# Patient Record
Sex: MALE | Race: WHITE | NOT HISPANIC OR LATINO | Employment: STUDENT | URBAN - METROPOLITAN AREA
[De-identification: names, ages, dates, MRNs, and addresses within clinical notes are randomized per-mention and may not be internally consistent; named-entity substitution may affect disease eponyms.]

---

## 2024-03-12 ENCOUNTER — LAB (OUTPATIENT)
Dept: LAB | Facility: HOSPITAL | Age: 19
End: 2024-03-12
Payer: COMMERCIAL

## 2024-03-12 ENCOUNTER — OFFICE VISIT (OUTPATIENT)
Dept: FAMILY MEDICINE CLINIC | Age: 19
End: 2024-03-12
Payer: COMMERCIAL

## 2024-03-12 VITALS
SYSTOLIC BLOOD PRESSURE: 112 MMHG | DIASTOLIC BLOOD PRESSURE: 63 MMHG | HEIGHT: 72 IN | HEART RATE: 70 BPM | BODY MASS INDEX: 25.71 KG/M2 | WEIGHT: 189.8 LBS

## 2024-03-12 DIAGNOSIS — Z13.29 SCREENING FOR THYROID DISORDER: ICD-10-CM

## 2024-03-12 DIAGNOSIS — Z00.00 ANNUAL PHYSICAL EXAM: ICD-10-CM

## 2024-03-12 DIAGNOSIS — R19.7 DIARRHEA, UNSPECIFIED TYPE: ICD-10-CM

## 2024-03-12 DIAGNOSIS — Z00.00 ENCOUNTER FOR MEDICAL EXAMINATION TO ESTABLISH CARE: Primary | ICD-10-CM

## 2024-03-12 LAB
027 TOXIN: NORMAL
ALBUMIN SERPL-MCNC: 4.6 G/DL (ref 3.5–5.2)
ALBUMIN/GLOB SERPL: 2.2 G/DL
ALP SERPL-CCNC: 90 U/L (ref 39–117)
ALT SERPL W P-5'-P-CCNC: 13 U/L (ref 1–41)
ANION GAP SERPL CALCULATED.3IONS-SCNC: 8.7 MMOL/L (ref 5–15)
AST SERPL-CCNC: 11 U/L (ref 1–40)
BASOPHILS # BLD AUTO: 0.05 10*3/MM3 (ref 0–0.2)
BASOPHILS NFR BLD AUTO: 0.9 % (ref 0–1.5)
BILIRUB SERPL-MCNC: 0.4 MG/DL (ref 0–1.2)
BUN SERPL-MCNC: 10 MG/DL (ref 6–20)
BUN/CREAT SERPL: 11.1 (ref 7–25)
C DIFF TOX GENS STL QL NAA+PROBE: NEGATIVE
CALCIUM SPEC-SCNC: 9.2 MG/DL (ref 8.6–10.5)
CHLORIDE SERPL-SCNC: 105 MMOL/L (ref 98–107)
CO2 SERPL-SCNC: 25.3 MMOL/L (ref 22–29)
CREAT SERPL-MCNC: 0.9 MG/DL (ref 0.76–1.27)
DEPRECATED RDW RBC AUTO: 40.6 FL (ref 37–54)
EGFRCR SERPLBLD CKD-EPI 2021: 125.4 ML/MIN/1.73
EOSINOPHIL # BLD AUTO: 0.14 10*3/MM3 (ref 0–0.4)
EOSINOPHIL NFR BLD AUTO: 2.6 % (ref 0.3–6.2)
ERYTHROCYTE [DISTWIDTH] IN BLOOD BY AUTOMATED COUNT: 12.5 % (ref 12.3–15.4)
GLOBULIN UR ELPH-MCNC: 2.1 GM/DL
GLUCOSE SERPL-MCNC: 82 MG/DL (ref 65–99)
H. PYLORI ANTIGEN STOOL: NEGATIVE
HCT VFR BLD AUTO: 46.3 % (ref 37.5–51)
HEMOCCULT STL QL IA: NEGATIVE
HGB BLD-MCNC: 15.7 G/DL (ref 13–17.7)
IMM GRANULOCYTES # BLD AUTO: 0 10*3/MM3 (ref 0–0.05)
IMM GRANULOCYTES NFR BLD AUTO: 0 % (ref 0–0.5)
LACTOFERRIN STL QL LA: NEGATIVE
LYMPHOCYTES # BLD AUTO: 1.99 10*3/MM3 (ref 0.7–3.1)
LYMPHOCYTES NFR BLD AUTO: 37.6 % (ref 19.6–45.3)
MCH RBC QN AUTO: 29.6 PG (ref 26.6–33)
MCHC RBC AUTO-ENTMCNC: 33.9 G/DL (ref 31.5–35.7)
MCV RBC AUTO: 87.4 FL (ref 79–97)
MONOCYTES # BLD AUTO: 0.55 10*3/MM3 (ref 0.1–0.9)
MONOCYTES NFR BLD AUTO: 10.4 % (ref 5–12)
NEUTROPHILS NFR BLD AUTO: 2.56 10*3/MM3 (ref 1.7–7)
NEUTROPHILS NFR BLD AUTO: 48.5 % (ref 42.7–76)
PLATELET # BLD AUTO: 254 10*3/MM3 (ref 140–450)
PMV BLD AUTO: 9.3 FL (ref 6–12)
POTASSIUM SERPL-SCNC: 4.1 MMOL/L (ref 3.5–5.2)
PROT SERPL-MCNC: 6.7 G/DL (ref 6–8.5)
RBC # BLD AUTO: 5.3 10*6/MM3 (ref 4.14–5.8)
SODIUM SERPL-SCNC: 139 MMOL/L (ref 136–145)
TSH SERPL DL<=0.05 MIU/L-ACNC: 0.76 UIU/ML (ref 0.27–4.2)
WBC NRBC COR # BLD AUTO: 5.29 10*3/MM3 (ref 3.4–10.8)

## 2024-03-12 PROCEDURE — 87338 HPYLORI STOOL AG IA: CPT

## 2024-03-12 PROCEDURE — 36415 COLL VENOUS BLD VENIPUNCTURE: CPT

## 2024-03-12 PROCEDURE — 82274 ASSAY TEST FOR BLOOD FECAL: CPT

## 2024-03-12 PROCEDURE — 80050 GENERAL HEALTH PANEL: CPT

## 2024-03-12 PROCEDURE — 82653 EL-1 FECAL QUANTITATIVE: CPT

## 2024-03-12 PROCEDURE — 87493 C DIFF AMPLIFIED PROBE: CPT

## 2024-03-12 PROCEDURE — 83630 LACTOFERRIN FECAL (QUAL): CPT

## 2024-03-12 PROCEDURE — 87506 IADNA-DNA/RNA PROBE TQ 6-11: CPT

## 2024-03-12 RX ORDER — DICYCLOMINE HCL 20 MG
20 TABLET ORAL
Qty: 180 TABLET | Refills: 1 | Status: SHIPPED | OUTPATIENT
Start: 2024-03-12

## 2024-03-12 NOTE — PROGRESS NOTES
"Margie Chau presents to [facility] with complaint of  GI Problem (Diarrhea/stomach pain after eating, pt states it has been going on for quite sometime. )    SUBJECTIVE  History of Present Illness    Very pleasant patient is here to establish care. He is present with his mom. He is a student at [employer] and is majoring in economics and finance.  No significant past medical history, he has surgical history of tonsillectomy and wisdom teeth removal.  No family history.    He is being seen today to discuss ongoing abdominal pain and diarrhea.  Patient says that has been going on for the past 6 months but has gradually worsened.  Pain is described as a cramping sensation that occurs immediately after he has eaten.  Pain does not radiate.  Patient says that he has up to 6 loose stools per day.  Denies any blood in his stools.  He does endorse having some nausea with only one episode of vomiting. He has been using bentyl 20 mg tid which has helped somewhat as now he is only having 3 loose stools daily.    The following portions of the patient's history were reviewed and updated as appropriate: allergies, current medications, past family history, past medical history, past social history, past surgical history and problem list.    OBJECTIVE  Vital Signs:   /63 (BP Location: Right arm, Patient Position: Sitting)   Pulse 70   Ht 182.9 cm (72\")   Wt 86.1 kg (189 lb 12.8 oz)   BMI 25.74 kg/m²       Physical Exam  Constitutional:       General: He is not in acute distress.     Appearance: Normal appearance. He is not ill-appearing.   HENT:      Head: Normocephalic and atraumatic.      Right Ear: Tympanic membrane and ear canal normal.      Left Ear: Tympanic membrane and ear canal normal.      Nose: Nose normal.      Mouth/Throat:      Pharynx: Oropharynx is clear.   Neck:      Thyroid: No thyroid mass, thyromegaly or thyroid tenderness.   Cardiovascular:      Rate and Rhythm: " Normal rate and regular rhythm.      Pulses: Normal pulses.      Heart sounds: Normal heart sounds.   Pulmonary:      Effort: Pulmonary effort is normal. No respiratory distress.      Breath sounds: Normal breath sounds.   Chest:      Chest wall: No tenderness.   Abdominal:      General: Bowel sounds are normal. There is no distension.      Palpations: Abdomen is soft. There is no mass.      Tenderness: There is abdominal tenderness in the right lower quadrant. There is no guarding or rebound. Negative signs include Perkins's sign.   Musculoskeletal:         General: Normal range of motion.      Cervical back: Normal range of motion and neck supple.   Lymphadenopathy:      Cervical: No cervical adenopathy.   Skin:     General: Skin is warm and dry.   Neurological:      General: No focal deficit present.      Mental Status: He is alert and oriented to person, place, and time. Mental status is at baseline.   Psychiatric:         Mood and Affect: Mood normal.         Behavior: Behavior normal.            ASSESSMENT AND PLAN:  Diagnoses and all orders for this visit:    1. Encounter for medical examination to establish care (Primary)    2. Screening for thyroid disorder  -     TSH Rfx On Abnormal To Free T4; Future    3. Diarrhea, unspecified type  -     CBC & Differential; Future  -     Comprehensive Metabolic Panel; Future  -     Clostridioides difficile Toxin, PCR - Stool, Per Rectum; Future  -     Enteric Bacterial Panel - Stool, Per Rectum; Future  -     Enteric Parasite Panel - Stool, Per Rectum; Future  -     Fecal Lactoferrin Qual. - Stool, Per Rectum; Future  -     Pancreatic Elastase, Fecal - Stool, Per Rectum; Future  -     Occult Blood, Fecal By Immunoassay - Stool, Per Rectum; Future  -     H. Pylori Antigen, Stool - Stool, Per Rectum; Future  -     dicyclomine (BENTYL) 20 MG tablet; Take 1 tablet by mouth Every 4 (Four) to 6 (Six) Hours As Needed for Abdominal Cramping.  Dispense: 180 tablet; Refill:  1      Symptoms consistent with IBS, but checking Cmp Cbc and Tsh as well as stools studies. He was given bentyl refills in the meantime. May need imaging in the future. Discussed strict Er precautions.       Follow Up   Return for depending on lab results. Patient to notify office with any acute concerns or issues.  Patient verbalizes understanding, agrees with plan of care and has no further questions upon discharge.     Patient was given instructions and counseling regarding his condition or for health maintenance advice. Please see specific information pulled into the AVS if appropriate.     Discussed the importance of following up with any needed screening tests/labs/specialist appointments and any requested follow-up recommended by me today. Importance of maintaining follow-up discussed and patient accepts that missed appointments can delay diagnosis and potentially lead to worsening of conditions.    Part of this note may be an electronic transcription/translation of spoken language to printed text using the Dragon Dictation System.

## 2024-03-13 LAB
C COLI+JEJ+UPSA DNA STL QL NAA+NON-PROBE: NOT DETECTED
CRYPTOSP DNA STL QL NAA+NON-PROBE: NOT DETECTED
E HISTOLYT DNA STL QL NAA+NON-PROBE: NOT DETECTED
EC STX1+STX2 GENES STL QL NAA+NON-PROBE: NOT DETECTED
G LAMBLIA DNA STL QL NAA+NON-PROBE: NOT DETECTED
S ENT+BONG DNA STL QL NAA+NON-PROBE: NOT DETECTED
SHIGELLA SP+EIEC IPAH ST NAA+NON-PROBE: NOT DETECTED

## 2024-03-14 ENCOUNTER — OFFICE VISIT (OUTPATIENT)
Dept: FAMILY MEDICINE CLINIC | Age: 19
End: 2024-03-14
Payer: COMMERCIAL

## 2024-03-14 ENCOUNTER — HOSPITAL ENCOUNTER (OUTPATIENT)
Dept: GENERAL RADIOLOGY | Facility: HOSPITAL | Age: 19
Discharge: HOME | End: 2024-03-14
Admitting: NURSE PRACTITIONER
Payer: COMMERCIAL

## 2024-03-14 VITALS
TEMPERATURE: 97.9 F | OXYGEN SATURATION: 98 % | HEIGHT: 72 IN | WEIGHT: 186 LBS | BODY MASS INDEX: 25.19 KG/M2 | SYSTOLIC BLOOD PRESSURE: 112 MMHG | DIASTOLIC BLOOD PRESSURE: 63 MMHG | HEART RATE: 86 BPM

## 2024-03-14 DIAGNOSIS — R10.30 LOWER ABDOMINAL PAIN: ICD-10-CM

## 2024-03-14 DIAGNOSIS — R10.30 LOWER ABDOMINAL PAIN: Primary | ICD-10-CM

## 2024-03-14 DIAGNOSIS — F43.9 SITUATIONAL STRESS: ICD-10-CM

## 2024-03-14 PROCEDURE — 74018 RADEX ABDOMEN 1 VIEW: CPT

## 2024-03-14 PROCEDURE — 99214 OFFICE O/P EST MOD 30 MIN: CPT | Performed by: NURSE PRACTITIONER

## 2024-03-14 NOTE — PROGRESS NOTES
Chief Complaint  Margie Chau presents to [facility] for Abdominal Pain (Worsening over the last month)    Subjective          History of Present Illness    Margie is here today with mother. C/o bilateral lower abdominal and pelvic pain. Reports started about one month ago. Doesn't matter what he is eating. Some relief with bentyl. Reports alternating diarrhea and constipation. Denies heartburn. He had lab work earlier this week that has resulted as normal so far. He had an intense episode of pain yesterday which has prompted his visit today.   Does report some stressors with school and extracurricular activities recently.     Review of Systems      No Known Allergies   History reviewed. No pertinent past medical history.  Current Outpatient Medications   Medication Sig Dispense Refill    dicyclomine (BENTYL) 20 MG tablet Take 1 tablet by mouth Every 4 (Four) to 6 (Six) Hours As Needed for Abdominal Cramping. 180 tablet 1     No current facility-administered medications for this visit.     Past Surgical History:   Procedure Laterality Date    TONSILLECTOMY      WISDOM TOOTH EXTRACTION        Social History     Tobacco Use    Smoking status: Never     Passive exposure: Never    Smokeless tobacco: Never   Vaping Use    Vaping status: Never Used   Substance Use Topics    Alcohol use: Never    Drug use: Never     History reviewed. No pertinent family history.  Health Maintenance Due   Topic Date Due    HEPATITIS C SCREENING  Never done    ANNUAL PHYSICAL  Never done      Immunization History   Administered Date(s) Administered    COVID-19 (PFIZER) Purple Cap Monovalent 04/16/2021, 05/07/2021, 01/11/2022    COVID-19 F23 (PFIZER) 12YRS+ (COMIRNATY) 12/07/2023    DTaP 5 2004, 2004, 2004, 07/18/2005, 03/26/2008    FluMist 2-49yrs 11/02/2007, 10/06/2015    Fluzone (or Fluarix & Flulaval for VFC) >6mos 09/27/2019, 11/11/2021, 10/06/2023    HPV Quadrivalent 07/06/2015     "Hep A, 2 Dose 01/24/2018, 08/07/2018    Hep B, Adolescent or Pediatric 2004, 2004, 07/18/2005    Hib (PRP-T) 2004, 2004, 07/18/2005    Hpv9 01/24/2018    IPV 2004, 2004, 2004, 03/26/2008    Influenza Injectable Mdck Pf Quad 10/24/2022    Influenza LAIV (Nasal) 12/18/2006, 09/24/2008, 09/10/2009, 09/24/2010, 08/16/2011, 10/05/2012, 10/22/2013, 10/14/2014    Influenza Seasonal Injectable 10/20/2005, 10/20/2016    MCV4 Unspecified 03/06/2020    MMR 03/04/2005, 03/26/2008    Meningococcal Conjugate 07/06/2015    PEDS-Pneumococcal Conjugate (PCV7) 2004, 2004, 2004, 07/18/2005    Tdap 07/06/2015    Trumenba(meningococcal B) 03/06/2020, 08/31/2022    Varicella 03/04/2005, 03/26/2008        Objective     Vitals:    03/14/24 1318   BP: 112/63   BP Location: Right arm   Patient Position: Sitting   Cuff Size: Large Adult   Pulse: 86   Temp: 97.9 °F (36.6 °C)   TempSrc: Oral   SpO2: 98%   Weight: 84.4 kg (186 lb)   Height: 182.9 cm (72\")     Body mass index is 25.23 kg/m².             Physical Exam  Vitals reviewed.   Constitutional:       General: He is not in acute distress.     Appearance: Normal appearance. He is well-developed.   HENT:      Head: Normocephalic and atraumatic.   Cardiovascular:      Rate and Rhythm: Normal rate and regular rhythm.   Pulmonary:      Effort: Pulmonary effort is normal.      Breath sounds: Normal breath sounds.   Abdominal:      General: Bowel sounds are normal.      Palpations: Abdomen is soft.      Tenderness: There is abdominal tenderness (bilateral lower abdomen, minimal).   Neurological:      Mental Status: He is alert and oriented to person, place, and time.   Psychiatric:         Mood and Affect: Mood and affect normal.           Result Review :               H. Pylori Antigen, Stool - Stool, Per Rectum (03/12/2024 15:52)  Occult Blood, Fecal By Immunoassay - Stool, Per Rectum (03/12/2024 15:52)  Fecal Lactoferrin Qual. - " Stool, Per Rectum (03/12/2024 15:52)  Enteric Parasite Panel - Stool, Per Rectum (03/12/2024 15:52)  Enteric Bacterial Panel - Stool, Per Rectum (03/12/2024 15:52)  Clostridioides difficile Toxin, PCR - Stool, Per Rectum (03/12/2024 15:52)  TSH Rfx On Abnormal To Free T4 (03/12/2024 14:49)  Comprehensive Metabolic Panel (03/12/2024 14:49)  CBC & Differential (03/12/2024 14:49)     XR Abdomen KUB (03/14/2024 13:58)            Assessment and Plan      Diagnoses and all orders for this visit:    1. Lower abdominal pain (Primary)  Comments:  Xray normal. With labs normal, will proceed with CT scan for further evaluation. May continue bentyl. ER precautions given.  Orders:  -     XR Abdomen KUB; Future  -     CT Abdomen Pelvis With Contrast; Future    2. Situational stress  Comments:  Discussed stress and anxiety and possible relation to abdominal symptoms. Discussed medication and therapy. He will think about options.              Follow Up     Return for As needed for persistent or worsening symptoms.

## 2024-03-18 LAB — ELASTASE PANC STL-MCNT: 320 UG ELAST./G

## 2024-03-22 ENCOUNTER — HOSPITAL ENCOUNTER (OUTPATIENT)
Dept: CT IMAGING | Facility: HOSPITAL | Age: 19
Discharge: HOME | End: 2024-03-22
Admitting: NURSE PRACTITIONER
Payer: COMMERCIAL

## 2024-03-22 ENCOUNTER — OFFICE VISIT (OUTPATIENT)
Dept: FAMILY MEDICINE CLINIC | Age: 19
End: 2024-03-22
Payer: COMMERCIAL

## 2024-03-22 VITALS
WEIGHT: 184.8 LBS | HEIGHT: 72 IN | DIASTOLIC BLOOD PRESSURE: 67 MMHG | BODY MASS INDEX: 25.03 KG/M2 | HEART RATE: 67 BPM | SYSTOLIC BLOOD PRESSURE: 125 MMHG

## 2024-03-22 DIAGNOSIS — R10.30 LOWER ABDOMINAL PAIN: ICD-10-CM

## 2024-03-22 DIAGNOSIS — K76.9 LIVER LESION: ICD-10-CM

## 2024-03-22 DIAGNOSIS — F41.1 GAD (GENERALIZED ANXIETY DISORDER): ICD-10-CM

## 2024-03-22 DIAGNOSIS — R19.7 DIARRHEA, UNSPECIFIED TYPE: ICD-10-CM

## 2024-03-22 DIAGNOSIS — R10.30 LOWER ABDOMINAL PAIN: Primary | ICD-10-CM

## 2024-03-22 PROCEDURE — 99214 OFFICE O/P EST MOD 30 MIN: CPT | Performed by: NURSE PRACTITIONER

## 2024-03-22 PROCEDURE — 74177 CT ABD & PELVIS W/CONTRAST: CPT

## 2024-03-22 PROCEDURE — 25510000001 IOPAMIDOL PER 1 ML: Performed by: NURSE PRACTITIONER

## 2024-03-22 RX ORDER — BUSPIRONE HYDROCHLORIDE 5 MG/1
5 TABLET ORAL 3 TIMES DAILY PRN
Qty: 90 TABLET | Refills: 0 | Status: SHIPPED | OUTPATIENT
Start: 2024-03-22

## 2024-03-22 RX ORDER — ESCITALOPRAM OXALATE 10 MG/1
10 TABLET ORAL DAILY
Qty: 90 TABLET | Refills: 0 | Status: SHIPPED | OUTPATIENT
Start: 2024-03-22

## 2024-03-22 RX ADMIN — IOPAMIDOL 100 ML: 755 INJECTION, SOLUTION INTRAVENOUS at 13:44

## 2024-03-22 NOTE — PROGRESS NOTES
"Margie Chau presents to [facility] with complaint of  Anxiety, abdominal pain follow-up    SUBJECTIVE  History of Present Illness    Patient is here with his mother to discuss ongoing abdominal pain and anxiety symptoms.  He saw Olga Rodriguez on March 14, had KUB completed that was negative.  He had abdominal CT completed today that showed incidental indeterminate focus of the right hepatic lobe measuring 3.6 x 1.3 cm.  Radiologist recommends abdominal MRI with and without contrast to further evaluate this area.  Patient is not having any pain over the area of his liver.  His pain is suprapubic and in groin area.  Since he was seen by me, patient has had multiple episodes of diarrhea.  Patient says he feels like he is going to have the urge to have diarrhea and sometimes he will and sometimes he will not.  Patient says that he feels he is unable to complete daily tasks or go in town as he is afraid that he will not be close enough to the bathroom.  He says he does not feel that he is overly anxious and is not sure if his bowel habits and abdominal pain is related to his anxiety, however he is willing to see if any anxiety medication may help improve his diarrhea/abdominal cramping.      OBJECTIVE  Vital Signs:   /67 (BP Location: Right arm, Patient Position: Sitting)   Pulse 67   Ht 182.9 cm (72\")   Wt 83.8 kg (184 lb 12.8 oz)   BMI 25.06 kg/m²       Physical Exam  Constitutional:       General: He is not in acute distress.     Appearance: Normal appearance. He is not ill-appearing.   HENT:      Head: Normocephalic and atraumatic.      Nose: Nose normal.      Mouth/Throat:      Pharynx: Oropharynx is clear.   Cardiovascular:      Rate and Rhythm: Normal rate and regular rhythm.      Pulses: Normal pulses.      Heart sounds: Normal heart sounds.   Pulmonary:      Effort: Pulmonary effort is normal. No respiratory distress.      Breath sounds: Normal breath sounds. "   Chest:      Chest wall: No tenderness.   Musculoskeletal:         General: Normal range of motion.      Cervical back: Normal range of motion and neck supple.   Skin:     General: Skin is warm and dry.   Neurological:      General: No focal deficit present.      Mental Status: He is alert and oriented to person, place, and time. Mental status is at baseline.   Psychiatric:         Mood and Affect: Mood normal.         Behavior: Behavior normal.          Results Review:  The following data was reviewed by Blanca Morales, JHONNY [unfilled] 14:26 EDT.  CT Abdomen Pelvis With Contrast [[id number]] (Order [id number])  Order  Status: Final result     Appointment Information    Display Notes    [facility] - [facility] 03.20.24 PIF @ TOS                  PACS Images     Radiology Images      Study Result    Narrative & Impression   CT ABDOMEN PELVIS W CONTRAST-     Date of Exam: 3/22/2024 1:27 PM     Indication: LLQ abdominal pain; R10.30-Lower abdominal pain,  unspecified.     Comparison: None available.     Technique: Axial CT images were obtained of the abdomen and pelvis  following the uneventful intravenous administration of iodinated contra.  Reconstructed coronal and sagittal images were also obtained. Automated  exposure control and iterative construction methods were used.        Findings:  The lung bases are clear bilaterally.     In the lateral aspect of the right hepatic lobe is a 1.3 cm x 3.6 cm  focus of low density. The gallbladder, spleen, pancreas and adrenal  glands appear unremarkable lung. Both kidneys appear normal.     No adenopathy or free fluid is seen in the abdomen or pelvis. A few  subcentimeter mesenteric lymph nodes are noted, nonpathologic by size  criteria. No ureteral stone is seen on either side. The urinary bladder,  seminal vesicles and prostate appear normal. No hernia is seen. No acute  bowel abnormality is seen. The lack of oral contrast limits evaluation  of the bowel.     No fracture or malalignment is seen.            IMPRESSION:  Impression:  1.  Indeterminate focus of low density in the right hepatic lobe  measuring 3.6 cm x 1.3 cm. Finding could represent sequela of previous  trauma, infection or, less likely in a patient of this age, neoplasm.  Comparison with any previous abdomen CT would be useful if available. If  not available, consider abdomen MRI with and without intravenous  contrast to further evaluate.           Electronically Signed By-Charles Calvert MD On:3/22/2024 2:20 PM         ASSESSMENT AND PLAN:  Diagnoses and all orders for this visit:    1. Lower abdominal pain (Primary)    2. Liver lesion  -     MRI Abdomen With & Without Contrast; Future    3. Diarrhea, unspecified type  -     Ambulatory Referral to General Surgery    4. ANDREY (generalized anxiety disorder)  -     escitalopram (Lexapro) 10 MG tablet; Take 1 tablet by mouth Daily.  Dispense: 90 tablet; Refill: 0  -     busPIRone (BUSPAR) 5 MG tablet; Take 1 tablet by mouth 3 (Three) Times a Day As Needed (anxiety).  Dispense: 90 tablet; Refill: 0      Will obtain abdominal MRI with and without contrast per radiology recommendation.  Doubt that this finding explains his current symptoms and pain.  He has had normal stool studies.  Given that his diarrhea is not really improved, patient will be referred for colonoscopy to ensure this is not inflammatory bowel disorder, or other explanation for his diarrhea. In the meantime, he is willing to try anxiety medication.  He will start Lexapro 10 mg daily.  Educated on medication side effects.  He was also given BuSpar to take as needed in the interim I will give Lexapro time to kick in.  Will follow-up in 6 weeks for reassessment.      Follow Up   Return in about 6 weeks (around 5/3/2024). Patient to notify office with any acute concerns or issues.  Patient verbalizes understanding, agrees with plan of care and has no further questions upon discharge.     Patient was given instructions and counseling  regarding his condition or for health maintenance advice. Please see specific information pulled into the AVS if appropriate.     Discussed the importance of following up with any needed screening tests/labs/specialist appointments and any requested follow-up recommended by me today. Importance of maintaining follow-up discussed and patient accepts that missed appointments can delay diagnosis and potentially lead to worsening of conditions.    Part of this note may be an electronic transcription/translation of spoken language to printed text using the Dragon Dictation System.

## 2024-04-16 ENCOUNTER — HOSPITAL ENCOUNTER (OUTPATIENT)
Dept: MRI IMAGING | Facility: HOSPITAL | Age: 19
Discharge: HOME | End: 2024-04-16
Admitting: NURSE PRACTITIONER
Payer: COMMERCIAL

## 2024-04-16 DIAGNOSIS — K76.9 LIVER LESION: ICD-10-CM

## 2024-04-16 PROCEDURE — 0 GADOBENATE DIMEGLUMINE 529 MG/ML SOLUTION: Performed by: NURSE PRACTITIONER

## 2024-04-16 PROCEDURE — A9577 INJ MULTIHANCE: HCPCS | Performed by: NURSE PRACTITIONER

## 2024-04-16 PROCEDURE — 74183 MRI ABD W/O CNTR FLWD CNTR: CPT

## 2024-04-16 RX ADMIN — GADOBENATE DIMEGLUMINE 20 ML: 529 INJECTION, SOLUTION INTRAVENOUS at 09:35

## 2024-05-13 ENCOUNTER — OFFICE VISIT (OUTPATIENT)
Dept: FAMILY MEDICINE CLINIC | Age: 19
End: 2024-05-13
Payer: COMMERCIAL

## 2024-05-13 VITALS
HEART RATE: 71 BPM | HEIGHT: 72 IN | WEIGHT: 176.2 LBS | SYSTOLIC BLOOD PRESSURE: 138 MMHG | BODY MASS INDEX: 23.86 KG/M2 | DIASTOLIC BLOOD PRESSURE: 79 MMHG

## 2024-05-13 DIAGNOSIS — R19.7 DIARRHEA, UNSPECIFIED TYPE: ICD-10-CM

## 2024-05-13 DIAGNOSIS — F41.1 GAD (GENERALIZED ANXIETY DISORDER): Primary | ICD-10-CM

## 2024-05-13 PROCEDURE — 99214 OFFICE O/P EST MOD 30 MIN: CPT | Performed by: NURSE PRACTITIONER

## 2024-05-13 RX ORDER — DICYCLOMINE HCL 20 MG
20 TABLET ORAL
Qty: 180 TABLET | Refills: 1 | Status: SHIPPED | OUTPATIENT
Start: 2024-05-13

## 2024-05-13 RX ORDER — ESCITALOPRAM OXALATE 10 MG/1
10 TABLET ORAL DAILY
Qty: 90 TABLET | Refills: 2 | Status: SHIPPED | OUTPATIENT
Start: 2024-05-13

## 2024-05-13 RX ORDER — MONTELUKAST SODIUM 4 MG/1
1 TABLET, CHEWABLE ORAL 3 TIMES DAILY PRN
Qty: 90 TABLET | Refills: 1 | Status: SHIPPED | OUTPATIENT
Start: 2024-05-13

## 2024-05-13 NOTE — PROGRESS NOTES
"Margie Chau presents to [facility] with complaint of  Anxiety (6 wk follow up )    SUBJECTIVE  History of Present Illness    Patient is here for 6-week follow-up of anxiety and diarrhea.  At his last visit, he was started on Lexapro 10 mg daily.  He was also started on BuSpar 5 mg 3 times per day as needed.  Patient took the BuSpar couple of times but says he has not really needed this medication recently.  He sees a good benefit from the Lexapro.  He feels happier overall, decreased anxiety symptoms and is not worrying as much.  He denies any adverse side effects from the medication, denies suicidal thoughts.    He saw Dr. Elmore and had colonoscopy.  For some reason, these reports were not available for me to see in care everywhere.  His mom is with him and reports that he was told he had a spastic colon, which may be consistent with IBS.  No other concerning findings reported.  He was started on Hyoscyamine, patient took this for couple of weeks but found the medication to be ineffective.  For this reason he switched back to using Imodium and dicyclomine.  Patient says that his diarrhea has resolved with use of Imodium and dicyclomine as long as he is taking it on a schedule IV times per day before meals.      OBJECTIVE  Vital Signs:   /79 (BP Location: Right arm, Patient Position: Sitting)   Pulse 71   Ht 182.9 cm (72\")   Wt 79.9 kg (176 lb 3.2 oz)   BMI 23.90 kg/m²       Physical Exam  Constitutional:       General: He is not in acute distress.     Appearance: Normal appearance. He is not ill-appearing.   HENT:      Head: Normocephalic and atraumatic.      Nose: Nose normal.      Mouth/Throat:      Pharynx: Oropharynx is clear.   Cardiovascular:      Rate and Rhythm: Normal rate and regular rhythm.      Pulses: Normal pulses.      Heart sounds: Normal heart sounds.   Pulmonary:      Effort: Pulmonary effort is normal. No respiratory distress.      Breath " sounds: Normal breath sounds.   Chest:      Chest wall: No tenderness.   Musculoskeletal:         General: Normal range of motion.      Cervical back: Normal range of motion and neck supple.   Skin:     General: Skin is warm and dry.   Neurological:      General: No focal deficit present.      Mental Status: He is alert and oriented to person, place, and time. Mental status is at baseline.   Psychiatric:         Mood and Affect: Mood normal.         Behavior: Behavior normal.              ASSESSMENT AND PLAN:  Diagnoses and all orders for this visit:    1. ANDREY (generalized anxiety disorder) (Primary)  -     escitalopram (Lexapro) 10 MG tablet; Take 1 tablet by mouth Daily.  Dispense: 90 tablet; Refill: 2    2. Diarrhea, unspecified type  -     colestipol (COLESTID) 1 g tablet; Take 1 tablet by mouth 3 (Three) Times a Day As Needed (diarrhea).  Dispense: 90 tablet; Refill: 1  -     Ambulatory Referral to Gastroenterology  -     dicyclomine (BENTYL) 20 MG tablet; Take 1 tablet by mouth Every 4 (Four) to 6 (Six) Hours As Needed for Abdominal Cramping.  Dispense: 180 tablet; Refill: 1  -     NM Hepatobiliary Without CCK; Future        Anxiety is improved.  Refilled Lexapro.  Discontinue BuSpar off his medication list since he is not using his medication regularly.  For his diarrhea, general surgery did mention possibility of this being gallbladder etiology.  His abdominal CT did not show any gallbladder issue, patient has not had classic gallbladder symptoms or pain over his gallbladder area.  To rule this out however, we will obtain HIDA scan.  Referring to GI in case this is not related to his gallbladder and his HIDA comes back normal.  Lexa with patient that it is not ideal for him to be on Imodium long-term.  For this reason, we will trial colestipol.  Patient will stop using Imodium today.  He may use colestipol and Bentyl together, however would like for him to eventually see if he can wean off the dicyclomine as  well, especially in case he were to develop constipation with use of both of these medications.      Follow Up   No follow-ups on file. Patient to notify office with any acute concerns or issues.  Patient verbalizes understanding, agrees with plan of care and has no further questions upon discharge.     Patient was given instructions and counseling regarding his condition or for health maintenance advice. Please see specific information pulled into the AVS if appropriate.     Discussed the importance of following up with any needed screening tests/labs/specialist appointments and any requested follow-up recommended by me today. Importance of maintaining follow-up discussed and patient accepts that missed appointments can delay diagnosis and potentially lead to worsening of conditions.    Part of this note may be an electronic transcription/translation of spoken language to printed text using the Dragon Dictation System.       Helical Rim Text: The closure involved the helical rim.

## 2024-05-24 ENCOUNTER — HOSPITAL ENCOUNTER (OUTPATIENT)
Dept: NUCLEAR MEDICINE | Facility: HOSPITAL | Age: 19
Discharge: HOME | End: 2024-05-24
Payer: COMMERCIAL

## 2024-05-24 DIAGNOSIS — R19.7 DIARRHEA, UNSPECIFIED TYPE: ICD-10-CM

## 2024-05-24 PROCEDURE — 0 TECHNETIUM TC 99M MEBROFENIN KIT: Performed by: NURSE PRACTITIONER

## 2024-05-24 PROCEDURE — A9537 TC99M MEBROFENIN: HCPCS | Performed by: NURSE PRACTITIONER

## 2024-05-24 PROCEDURE — 78226 HEPATOBILIARY SYSTEM IMAGING: CPT

## 2024-05-24 RX ORDER — KIT FOR THE PREPARATION OF TECHNETIUM TC 99M MEBROFENIN 45 MG/10ML
1 INJECTION, POWDER, LYOPHILIZED, FOR SOLUTION INTRAVENOUS
Status: COMPLETED | OUTPATIENT
Start: 2024-05-24 | End: 2024-05-24

## 2024-05-24 RX ADMIN — MEBROFENIN 1 DOSE: 45 INJECTION, POWDER, LYOPHILIZED, FOR SOLUTION INTRAVENOUS at 09:58

## 2024-06-06 NOTE — PROGRESS NOTES
Chief Complaint   Patient presents with    Diarrhea         History of Present Illness  Patient is a 19-year-old male who presents today for Evaluation, referred for diarrhea.  He had a colonoscopy completed March 2024 with Dr. Ramírez Velazco.  Colonoscopy showed nodularity of the rectal wall from 10 to 15 cm without ulceration and diffuse spasticity of the colon.  Biopsies were obtained and were unremarkable.  Prior workup has also included an abdominal x-ray that was negative, CT scan with a abnormal appearing area in the right hepatic lobe, MRI that showed liver hemangiomas.  He had a HIDA scan that was abnormal with ejection fraction of 18%. He has also completed stool studies which were negative for infection.  Pancreatic fecal elastase was normal.    Patient presents today for evaluation with concerns about diarrhea.  This began acutely in February 2024.  When it started he was having 5 or more bowel movements per day.  Reports he was having nocturnal stools.  Reports symptoms were worse after eating but there were no particular dietary triggers.  He lost around 30 pounds when the diarrhea began.    He denies any blood in the stool or abdominal bloating.  When symptoms started he was having some mid and lower abdominal pain, this has improved with use of dicyclomine.    Denies any nausea, vomiting, heartburn or reflux.    Prior to symptom onset, denies any new medications, dietary changes, or travel.     Result Review :       Tissue Exam (03/29/2024 09:38)    TSH (03/29/2024 08:51)    COMPREHENSIVE METABOLIC PANEL (03/29/2024 08:51)    CBC - Hemogram ([facility]) (03/29/2024 08:51)    NM HIDA SCAN WITHOUT PHARMACOLOGICAL INTERVENTION (05/24/2024 12:14)    MRI Abdomen With & Without Contrast (04/16/2024 09:38)    XR Abdomen KUB (03/14/2024 13:58)    Referral for Diarrhea, unspecified type (05/13/2024)    Clostridioides difficile Toxin, PCR - Stool, Per Rectum (03/12/2024 15:52)  Enteric Bacterial Panel - Stool,  "Per Rectum (03/12/2024 15:52)  Enteric Parasite Panel - Stool, Per Rectum (03/12/2024 15:52)  Fecal Lactoferrin Qual. - Stool, Per Rectum (03/12/2024 15:52)  Pancreatic Elastase, Fecal - Stool, Per Rectum (03/12/2024 15:52)    Vital Signs:   BP 98/62   Pulse 88   Temp 98.4 °F (36.9 °C)   Ht 182.9 cm (72\")   Wt 80.6 kg (177 lb 11.2 oz)   SpO2 98%   BMI 24.10 kg/m²     Body mass index is 24.1 kg/m².     Physical Exam  Vitals reviewed.   Constitutional:       General: He is not in acute distress.     Appearance: He is well-developed.   HENT:      Head: Normocephalic and atraumatic.   Pulmonary:      Effort: Pulmonary effort is normal. No respiratory distress.   Abdominal:      General: Abdomen is flat. Bowel sounds are increased. There is no distension.      Palpations: Abdomen is soft.      Tenderness: There is no abdominal tenderness.   Skin:     General: Skin is dry.      Coloration: Skin is not pale.   Neurological:      Mental Status: He is alert and oriented to person, place, and time.   Psychiatric:         Thought Content: Thought content normal.           Assessment and Plan    Diagnoses and all orders for this visit:    1. Diarrhea, unspecified type (Primary)  -     Celiac Disease Panel  -     Calprotectin, Fecal - Stool, Per Rectum  -     Breath Hydrogen Test; Future         Discussion  Patient presents today for evaluation with concerns about diarrhea.  Workup thus far has been unrevealing as to cause.  Symptoms likely secondary to irritable bowel syndrome with diarrhea but due to abrupt onset and weight loss associated with symptoms, recommend blood work to evaluate for celiac disease.  If positive, we will then schedule EGD to confirm diagnosis.  Will check fecal calprotectin and if this is elevated, may consider capsule endoscopy to evaluate for any evidence of small bowel inflammation that would suggest Crohn's disease.  We will also schedule hydrogen breath testing to evaluate for small " intestinal bacterial overgrowth.    If all testing negative, to consider treatment course of Xifaxan for IBS-D.    Regarding abnormal HIDA scan, symptoms not typical for biliary dysfunction.  For now, would recommend holding off on cholecystectomy.  If our additional workup is negative and symptoms do not improve, could consider in the future.  We did discuss the diarrhea may worsen after cholecystectomy.          Follow Up   Return for Follow up to review results after testing complete.    Patient Instructions   Check lab work to evaluate for celiac disease.    Submit stool sample to assess fecal calprotectin to check for inflammation.    Schedule hydrogen breath test to assess for small intestinal bacterial overgrowth.     For diarrhea, continue current regimen of Imodium and dicyclomine.

## 2024-06-07 ENCOUNTER — OFFICE VISIT (OUTPATIENT)
Dept: GASTROENTEROLOGY | Facility: CLINIC | Age: 19
End: 2024-06-07
Payer: COMMERCIAL

## 2024-06-07 ENCOUNTER — LAB (OUTPATIENT)
Dept: LAB | Facility: HOSPITAL | Age: 19
End: 2024-06-07
Payer: COMMERCIAL

## 2024-06-07 VITALS
SYSTOLIC BLOOD PRESSURE: 98 MMHG | OXYGEN SATURATION: 98 % | TEMPERATURE: 98.4 F | BODY MASS INDEX: 24.07 KG/M2 | HEIGHT: 72 IN | DIASTOLIC BLOOD PRESSURE: 62 MMHG | HEART RATE: 88 BPM | WEIGHT: 177.7 LBS

## 2024-06-07 DIAGNOSIS — R19.7 DIARRHEA, UNSPECIFIED TYPE: Primary | ICD-10-CM

## 2024-06-07 PROCEDURE — 86231 EMA EACH IG CLASS: CPT | Performed by: NURSE PRACTITIONER

## 2024-06-07 PROCEDURE — 86364 TISS TRNSGLTMNASE EA IG CLAS: CPT | Performed by: NURSE PRACTITIONER

## 2024-06-07 PROCEDURE — 36415 COLL VENOUS BLD VENIPUNCTURE: CPT | Performed by: NURSE PRACTITIONER

## 2024-06-07 PROCEDURE — 82784 ASSAY IGA/IGD/IGG/IGM EACH: CPT | Performed by: NURSE PRACTITIONER

## 2024-06-07 PROCEDURE — 99214 OFFICE O/P EST MOD 30 MIN: CPT | Performed by: NURSE PRACTITIONER

## 2024-06-07 RX ORDER — LOPERAMIDE HYDROCHLORIDE 2 MG/1
TABLET ORAL
COMMUNITY
Start: 2024-02-24

## 2024-06-07 NOTE — PATIENT INSTRUCTIONS
Check lab work to evaluate for celiac disease.    Submit stool sample to assess fecal calprotectin to check for inflammation.    Schedule hydrogen breath test to assess for small intestinal bacterial overgrowth.     For diarrhea, continue current regimen of Imodium and dicyclomine.

## 2024-06-10 LAB
ENDOMYSIUM IGA SER QL: NEGATIVE
IGA SERPL-MCNC: 77 MG/DL (ref 90–386)
TTG IGA SER-ACNC: <2 U/ML (ref 0–3)
TTG IGG SER-ACNC: 3 U/ML (ref 0–5)

## 2024-06-11 ENCOUNTER — LAB (OUTPATIENT)
Dept: LAB | Facility: HOSPITAL | Age: 19
End: 2024-06-11
Payer: COMMERCIAL

## 2024-06-11 PROCEDURE — 83993 ASSAY FOR CALPROTECTIN FECAL: CPT | Performed by: NURSE PRACTITIONER

## 2024-06-20 LAB — CALPROTECTIN STL-MCNT: 41 UG/G (ref 0–120)

## 2024-07-12 DIAGNOSIS — R19.7 DIARRHEA, UNSPECIFIED TYPE: ICD-10-CM

## 2024-07-12 NOTE — PROGRESS NOTES
I called patient and informed them. Pt verbalized understanding and agreement.    Scheduled for 08/22/2024 1015.

## 2024-07-25 ENCOUNTER — PATIENT MESSAGE (OUTPATIENT)
Dept: GASTROENTEROLOGY | Facility: CLINIC | Age: 19
End: 2024-07-25
Payer: COMMERCIAL

## 2024-08-02 ENCOUNTER — PREP FOR SURGERY (OUTPATIENT)
Dept: SURGERY | Facility: SURGERY CENTER | Age: 19
End: 2024-08-02
Payer: COMMERCIAL

## 2024-08-02 ENCOUNTER — OFFICE VISIT (OUTPATIENT)
Dept: GASTROENTEROLOGY | Facility: CLINIC | Age: 19
End: 2024-08-02
Payer: COMMERCIAL

## 2024-08-02 ENCOUNTER — SPECIALTY PHARMACY (OUTPATIENT)
Dept: GASTROENTEROLOGY | Facility: CLINIC | Age: 19
End: 2024-08-02
Payer: COMMERCIAL

## 2024-08-02 VITALS
DIASTOLIC BLOOD PRESSURE: 60 MMHG | SYSTOLIC BLOOD PRESSURE: 100 MMHG | TEMPERATURE: 96.4 F | HEART RATE: 65 BPM | OXYGEN SATURATION: 99 % | HEIGHT: 72 IN | WEIGHT: 173.5 LBS | BODY MASS INDEX: 23.5 KG/M2

## 2024-08-02 DIAGNOSIS — R76.8 LOW SERUM IGA FOR AGE: ICD-10-CM

## 2024-08-02 DIAGNOSIS — K58.0 IRRITABLE BOWEL SYNDROME WITH DIARRHEA: ICD-10-CM

## 2024-08-02 DIAGNOSIS — R19.7 DIARRHEA, UNSPECIFIED TYPE: Primary | ICD-10-CM

## 2024-08-02 DIAGNOSIS — R10.11 RIGHT UPPER QUADRANT ABDOMINAL PAIN: ICD-10-CM

## 2024-08-02 DIAGNOSIS — R63.4 WEIGHT LOSS: ICD-10-CM

## 2024-08-02 PROCEDURE — 99214 OFFICE O/P EST MOD 30 MIN: CPT | Performed by: NURSE PRACTITIONER

## 2024-08-02 RX ORDER — SODIUM CHLORIDE 0.9 % (FLUSH) 0.9 %
10 SYRINGE (ML) INJECTION AS NEEDED
Status: CANCELLED | OUTPATIENT
Start: 2024-08-02

## 2024-08-02 RX ORDER — SODIUM CHLORIDE, SODIUM LACTATE, POTASSIUM CHLORIDE, CALCIUM CHLORIDE 600; 310; 30; 20 MG/100ML; MG/100ML; MG/100ML; MG/100ML
30 INJECTION, SOLUTION INTRAVENOUS CONTINUOUS PRN
Status: CANCELLED | OUTPATIENT
Start: 2024-08-02

## 2024-08-02 RX ORDER — SODIUM CHLORIDE 0.9 % (FLUSH) 0.9 %
3 SYRINGE (ML) INJECTION EVERY 12 HOURS SCHEDULED
Status: CANCELLED | OUTPATIENT
Start: 2024-08-02

## 2024-08-02 RX ORDER — DICYCLOMINE HCL 20 MG
20 TABLET ORAL EVERY 6 HOURS PRN
Qty: 180 TABLET | Refills: 1 | Status: SHIPPED | OUTPATIENT
Start: 2024-08-02

## 2024-08-02 RX ORDER — BUSPIRONE HYDROCHLORIDE 5 MG/1
5 TABLET ORAL DAILY
COMMUNITY
End: 2024-08-02

## 2024-08-02 NOTE — PATIENT INSTRUCTIONS
Schedule EGD for further evaluation of symptoms and to check for Celiac disease.    For IBS-D, complete course of Xifaxan as prescribed.     Continue Imodium and dicyclomine.

## 2024-08-02 NOTE — PROGRESS NOTES
Xifaxan PA approved  Key: [id number]  CaseId:[id number];Status:Approved;Review Type:Prior Auth;Coverage Start Date:07/03/2024;Coverage End Date:08/16/2024;. Authorization Expiration Date: August 16, 2024.    Amelie Espinosa  Specialty Pharmacy Technician

## 2024-08-02 NOTE — SIGNIFICANT NOTE
Education provided the Patient on the following:    - Nothing to Eat or Drink after MN the night before the procedure  -You will need to have someone drive you home after your EGD and remain with you for 24 hours after the EGD  - The date of your EGD, your are welcome to have one visitor at bedside or remain within 10-15 minutes of [facility] [employer]  -Please wear warm socks when you arrive for your EGD  -Remove all jewelry and leave any valuables before arriving on the date of your procedure (all will have to be removed before leaving preop)  -You will need to arrive at 1100 on 8-5-24 EGD  -Feel free to contact us at: [phone] with any additional questions/concerns

## 2024-08-02 NOTE — PROGRESS NOTES
Chief Complaint   Patient presents with    Diarrhea    Abdominal Pain         History of Present Illness  Patient is a 19-year-old male who presents today for follow-up.  He was last seen in the office June 2024 with concerns about diarrhea and weight loss.    Workup had included colonoscopy completed March 2024 with Dr. Ramírez Velazco.  Colonoscopy showed nodularity of the rectal wall from 10 to 15 cm without ulceration and diffuse spasticity of the colon.  Biopsies were obtained and were unremarkable.  Prior workup has also included an abdominal x-ray that was negative, CT scan with a abnormal appearing area in the right hepatic lobe, MRI that showed liver hemangiomas.  He had a HIDA scan that was abnormal with ejection fraction of 18%. He has also completed stool studies which were negative for infection.  Pancreatic fecal elastase was normal.     At last office visit, celiac panel was obtained and was negative however he did have a low IgA level.  Fecal calprotectin was checked and was normal.  Hydrogen breath test was obtained to evaluate for SIBO and was negative.    Patient presents today for follow-up.  Since last office visit, he has continued on Imodium and dicyclomine 4 times daily.  He reports he has started to experience some increased symptoms with diarrhea and urgency despite taking these medications.  He continues to have intermittent right upper quadrant pain.  This comes and goes.  He has not noted any pattern or specific triggers for the symptoms.    He denies any nausea or vomiting.  Denies any heartburn or reflux.    He has lost 3 pounds since last office visit.  Reports baseline weight was 205 pounds and since symptoms started in February he has lost 30 to 35 pounds.     Result Review :       Breath Hydrogen Test (07/10/2024)    Calprotectin, Fecal - Stool, Per Rectum (06/11/2024 12:30)    Office Visit with Krystle Samuel APRN (06/07/2024)    Enteric Parasite Panel - Stool, Per Rectum  "(03/12/2024 15:52)    Enteric Bacterial Panel - Stool, Per Rectum (03/12/2024 15:52)    Pancreatic Elastase, Fecal - Stool, Per Rectum (03/12/2024 15:52)    Clostridioides difficile Toxin, PCR - Stool, Per Rectum (03/12/2024 15:52)    Fecal Lactoferrin Qual. - Stool, Per Rectum (03/12/2024 15:52)    Tissue Exam (03/29/2024 09:38)    TSH (03/29/2024 08:51)    Vital Signs:   /60   Pulse 65   Temp 96.4 °F (35.8 °C)   Ht 182.9 cm (72.01\")   Wt 78.7 kg (173 lb 8 oz)   SpO2 99%   BMI 23.53 kg/m²     Body mass index is 23.53 kg/m².     Physical Exam  Vitals reviewed.   Constitutional:       General: He is not in acute distress.     Appearance: He is well-developed.   HENT:      Head: Normocephalic and atraumatic.   Pulmonary:      Effort: Pulmonary effort is normal. No respiratory distress.   Abdominal:      General: Abdomen is flat. Bowel sounds are normal. There is no distension.      Palpations: Abdomen is soft.      Tenderness: There is no abdominal tenderness.   Skin:     General: Skin is dry.      Coloration: Skin is not pale.   Neurological:      Mental Status: He is alert and oriented to person, place, and time.   Psychiatric:         Thought Content: Thought content normal.           Assessment and Plan    Diagnoses and all orders for this visit:    1. Diarrhea, unspecified type (Primary)  -     dicyclomine (BENTYL) 20 MG tablet; Take 1 tablet by mouth Every 6 (Six) Hours As Needed (diarrhea).  Dispense: 180 tablet; Refill: 1    2. Right upper quadrant abdominal pain    3. Irritable bowel syndrome with diarrhea    4. 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  Dispense: 42 tablet; Refill: 2         Discussion  Patient presents today for follow-up with concerns about persistent diarrhea and intermittent right upper quadrant pain.  Prior workup showed abnormal HIDA scan but testing has otherwise been " unrevealing as to the source of his symptoms.    At last office visit, he completed a celiac panel which was negative however he had a low IgA level.  Discussed that this makes blood work to check for celiac potentially unreliable.  Due to his ongoing symptoms, recommended EGD with duodenal biopsy to evaluate for celiac disease.    If that were negative, could consider cholecystectomy however unclear if that would make his symptoms better and we did discuss at times diarrhea can worsen after cholecystectomy.    Dependent upon EGD findings, we may also consider capsule endoscopy due to his progressive weight loss to evaluate for any evidence of small bowel Crohn's disease.    While testing is being completed, he will continue using dicyclomine and Imodium and will provide prescription for treatment course of Xifaxan to try for IBS-D.          Follow Up   Return for Follow up to review results after testing complete.    Patient Instructions   Schedule EGD for further evaluation of symptoms and to check for Celiac disease.    For IBS-D, complete course of Xifaxan as prescribed.     Continue Imodium and dicyclomine.

## 2024-08-05 ENCOUNTER — ANESTHESIA (OUTPATIENT)
Dept: SURGERY | Facility: SURGERY CENTER | Age: 19
End: 2024-08-05
Payer: COMMERCIAL

## 2024-08-05 ENCOUNTER — HOSPITAL ENCOUNTER (OUTPATIENT)
Facility: SURGERY CENTER | Age: 19
Setting detail: HOSPITAL OUTPATIENT SURGERY
Discharge: HOME | End: 2024-08-05
Attending: INTERNAL MEDICINE | Admitting: INTERNAL MEDICINE
Payer: COMMERCIAL

## 2024-08-05 ENCOUNTER — ANESTHESIA EVENT (OUTPATIENT)
Dept: SURGERY | Facility: SURGERY CENTER | Age: 19
End: 2024-08-05
Payer: COMMERCIAL

## 2024-08-05 VITALS
SYSTOLIC BLOOD PRESSURE: 104 MMHG | OXYGEN SATURATION: 99 % | HEIGHT: 72 IN | BODY MASS INDEX: 23.51 KG/M2 | WEIGHT: 173.6 LBS | TEMPERATURE: 97.7 F | RESPIRATION RATE: 16 BRPM | DIASTOLIC BLOOD PRESSURE: 82 MMHG | HEART RATE: 67 BPM

## 2024-08-05 DIAGNOSIS — R63.4 WEIGHT LOSS: ICD-10-CM

## 2024-08-05 DIAGNOSIS — R19.7 DIARRHEA, UNSPECIFIED TYPE: ICD-10-CM

## 2024-08-05 DIAGNOSIS — R76.8 LOW SERUM IGA FOR AGE: ICD-10-CM

## 2024-08-05 DIAGNOSIS — R10.11 RIGHT UPPER QUADRANT ABDOMINAL PAIN: ICD-10-CM

## 2024-08-05 PROCEDURE — 43239 EGD BIOPSY SINGLE/MULTIPLE: CPT | Performed by: INTERNAL MEDICINE

## 2024-08-05 PROCEDURE — 25010000002 PROPOFOL 10 MG/ML EMULSION: Performed by: NURSE ANESTHETIST, CERTIFIED REGISTERED

## 2024-08-05 PROCEDURE — 88305 TISSUE EXAM BY PATHOLOGIST: CPT | Performed by: INTERNAL MEDICINE

## 2024-08-05 PROCEDURE — 25810000003 LACTATED RINGERS PER 1000 ML: Performed by: NURSE PRACTITIONER

## 2024-08-05 PROCEDURE — 25010000002 LIDOCAINE 1 % SOLUTION: Performed by: NURSE ANESTHETIST, CERTIFIED REGISTERED

## 2024-08-05 PROCEDURE — 25010000002 PROPOFOL 1000 MG/100ML EMULSION: Performed by: NURSE ANESTHETIST, CERTIFIED REGISTERED

## 2024-08-05 RX ORDER — SODIUM CHLORIDE 0.9 % (FLUSH) 0.9 %
10 SYRINGE (ML) INJECTION AS NEEDED
Status: DISCONTINUED | OUTPATIENT
Start: 2024-08-05 | End: 2024-08-05 | Stop reason: HOSPADM

## 2024-08-05 RX ORDER — SODIUM CHLORIDE 0.9 % (FLUSH) 0.9 %
3 SYRINGE (ML) INJECTION EVERY 12 HOURS SCHEDULED
Status: DISCONTINUED | OUTPATIENT
Start: 2024-08-05 | End: 2024-08-05 | Stop reason: HOSPADM

## 2024-08-05 RX ORDER — LIDOCAINE HYDROCHLORIDE 10 MG/ML
INJECTION, SOLUTION INFILTRATION; PERINEURAL AS NEEDED
Status: DISCONTINUED | OUTPATIENT
Start: 2024-08-05 | End: 2024-08-05 | Stop reason: SURG

## 2024-08-05 RX ORDER — SODIUM CHLORIDE, SODIUM LACTATE, POTASSIUM CHLORIDE, CALCIUM CHLORIDE 600; 310; 30; 20 MG/100ML; MG/100ML; MG/100ML; MG/100ML
30 INJECTION, SOLUTION INTRAVENOUS CONTINUOUS PRN
Status: DISCONTINUED | OUTPATIENT
Start: 2024-08-05 | End: 2024-08-05 | Stop reason: HOSPADM

## 2024-08-05 RX ORDER — PROPOFOL 10 MG/ML
INJECTION, EMULSION INTRAVENOUS AS NEEDED
Status: DISCONTINUED | OUTPATIENT
Start: 2024-08-05 | End: 2024-08-05 | Stop reason: SURG

## 2024-08-05 RX ADMIN — PROPOFOL 200 MCG/KG/MIN: 10 INJECTION, EMULSION INTRAVENOUS at 12:08

## 2024-08-05 RX ADMIN — LIDOCAINE HYDROCHLORIDE 50 MG: 10 INJECTION, SOLUTION INFILTRATION; PERINEURAL at 12:08

## 2024-08-05 RX ADMIN — SODIUM CHLORIDE, POTASSIUM CHLORIDE, SODIUM LACTATE AND CALCIUM CHLORIDE 30 ML/HR: 600; 310; 30; 20 INJECTION, SOLUTION INTRAVENOUS at 11:23

## 2024-08-05 RX ADMIN — PROPOFOL 80 MG: 10 INJECTION, EMULSION INTRAVENOUS at 12:08

## 2024-08-05 RX ADMIN — SODIUM CHLORIDE, POTASSIUM CHLORIDE, SODIUM LACTATE AND CALCIUM CHLORIDE: 600; 310; 30; 20 INJECTION, SOLUTION INTRAVENOUS at 12:07

## 2024-08-05 NOTE — ANESTHESIA POSTPROCEDURE EVALUATION
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 [R76.8])      (Weight loss [R63.4])    Surgeons: Jase Oliver MD Provider: Tai Fang MD    Anesthesia Type: MAC ASA Status: 1            Anesthesia Type: MAC    Vitals  Vitals Value Taken Time   BP 95/60 08/05/24 1235   Temp 36.5 °C (97.7 °F) 08/05/24 1220   Pulse 66 08/05/24 1235   Resp 16 08/05/24 1235   SpO2 98 % 08/05/24 1235           Post Anesthesia Care and Evaluation    Patient location during evaluation: PHASE II  Patient participation: complete - patient participated  Level of consciousness: awake  Pain management: satisfactory to patient    Airway patency: patent  Anesthetic complications: No anesthetic complications  PONV Status: controlled  Cardiovascular status: acceptable  Respiratory status: acceptable  Hydration status: acceptable

## 2024-08-05 NOTE — ANESTHESIA PREPROCEDURE EVALUATION
Anesthesia Evaluation     Patient summary reviewed and Nursing notes reviewed   NPO Solid Status: > 6 hours  NPO Liquid Status: > 2 hours           Airway   Mallampati: I  TM distance: >3 FB  Neck ROM: full  No difficulty expected  Dental - normal exam     Pulmonary     breath sounds clear to auscultation  (-) COPD, asthma, not a smoker  Cardiovascular   Exercise tolerance: good (4-7 METS)    Rhythm: regular  Rate: normal    (-) past MI, dysrhythmias, angina      Neuro/Psych  (-) seizures, CVA  GI/Hepatic/Renal/Endo    (-) GERD, liver disease, no renal disease, diabetes, no thyroid disorder    ROS Comment: Chronic diarrhea     Musculoskeletal     Abdominal    Substance History      OB/GYN          Other                    Anesthesia Plan    ASA 1     MAC       Anesthetic plan, risks, benefits, and alternatives have been provided, discussed and informed consent has been obtained with: patient.    CODE STATUS:

## 2024-08-09 LAB
LAB AP CASE REPORT: NORMAL
LAB AP CLINICAL INFORMATION: NORMAL
LAB AP DIAGNOSIS COMMENT: NORMAL
PATH REPORT.FINAL DX SPEC: NORMAL
PATH REPORT.GROSS SPEC: NORMAL

## 2024-08-22 ENCOUNTER — OFFICE VISIT (OUTPATIENT)
Dept: GASTROENTEROLOGY | Facility: CLINIC | Age: 19
End: 2024-08-22
Payer: COMMERCIAL

## 2024-08-22 ENCOUNTER — HOSPITAL ENCOUNTER (OUTPATIENT)
Dept: ULTRASOUND IMAGING | Facility: HOSPITAL | Age: 19
Discharge: HOME | End: 2024-08-22
Admitting: NURSE PRACTITIONER
Payer: COMMERCIAL

## 2024-08-22 VITALS
OXYGEN SATURATION: 98 % | TEMPERATURE: 98.1 F | HEIGHT: 72 IN | DIASTOLIC BLOOD PRESSURE: 76 MMHG | WEIGHT: 173.2 LBS | HEART RATE: 61 BPM | SYSTOLIC BLOOD PRESSURE: 102 MMHG | BODY MASS INDEX: 23.46 KG/M2

## 2024-08-22 DIAGNOSIS — R10.11 RIGHT UPPER QUADRANT ABDOMINAL PAIN: ICD-10-CM

## 2024-08-22 DIAGNOSIS — K21.00 GASTROESOPHAGEAL REFLUX DISEASE WITH ESOPHAGITIS WITHOUT HEMORRHAGE: ICD-10-CM

## 2024-08-22 DIAGNOSIS — R63.4 WEIGHT LOSS: ICD-10-CM

## 2024-08-22 DIAGNOSIS — R19.7 DIARRHEA, UNSPECIFIED TYPE: Primary | ICD-10-CM

## 2024-08-22 DIAGNOSIS — R94.8 ABNORMAL BILIARY HIDA SCAN: ICD-10-CM

## 2024-08-22 PROCEDURE — 99214 OFFICE O/P EST MOD 30 MIN: CPT | Performed by: NURSE PRACTITIONER

## 2024-08-22 PROCEDURE — 76705 ECHO EXAM OF ABDOMEN: CPT

## 2024-08-22 RX ORDER — DIPHENOXYLATE HYDROCHLORIDE AND ATROPINE SULFATE 2.5; .025 MG/1; MG/1
1 TABLET ORAL 4 TIMES DAILY PRN
Qty: 120 TABLET | Refills: 5 | Status: SHIPPED | OUTPATIENT
Start: 2024-08-22

## 2024-08-22 RX ORDER — PANTOPRAZOLE SODIUM 40 MG/1
40 TABLET, DELAYED RELEASE ORAL DAILY
Qty: 60 TABLET | Refills: 1 | Status: SHIPPED | OUTPATIENT
Start: 2024-08-22

## 2024-08-22 NOTE — PROGRESS NOTES
Chief Complaint   Patient presents with    Diarrhea         History of Present Illness  Patient is a 19-year-old male who presents today for follow-up.    Workup had included colonoscopy completed March 2024 with Dr. Ramírez Velazco.  Colonoscopy showed nodularity of the rectal wall from 10 to 15 cm without ulceration and diffuse spasticity of the colon.  Biopsies were obtained and were unremarkable.  Prior workup has also included an abdominal x-ray that was negative, CT scan with a abnormal appearing area in the right hepatic lobe, MRI that showed liver hemangiomas.  He had a HIDA scan that was abnormal with ejection fraction of 18%. He has also completed stool studies which were negative for infection.  Pancreatic fecal elastase was normal.      Celiac panel was obtained and was negative however he did have a low IgA level.  Fecal calprotectin was checked and was normal.  Hydrogen breath test was obtained to evaluate for SIBO and was negative.    Due to persistent symptoms and weight loss, EGD was planned And performed and showed esophagitis, gastritis, otherwise normal.  Biopsies were obtained to evaluate for celiac disease and were negative.    Patient presents today for follow-up.  He has had persistent symptoms since last office visit.  He completed course of Xifaxan with no improvement.  He continues on Imodium 4 times per day but feels it is no longer as effective as it was and he is having breakthrough diarrhea.  He continues to have diarrhea on a daily basis and intermittent abdominal pain.  Pain is generally present to the right upper quadrant and epigastric area when it occurs.    He has had continued weight loss, down 3 pounds since last office visit.  Reports he has been eating well.     Result Review :       Tissue Pathology Exam (08/05/2024 12:14)    Upper GI Endoscopy (08/05/2024 12:00)    Office Visit with Krystle Samuel APRN (08/02/2024)    Breath Hydrogen Test (07/10/2024)    Calprotectin,  "Fecal - Stool, Per Rectum (06/11/2024 12:30)    Office Visit with Krystle Samuel APRN (06/07/2024)    Enteric Bacterial Panel - Stool, Per Rectum (03/12/2024 15:52)    Pancreatic Elastase, Fecal - Stool, Per Rectum (03/12/2024 15:52)    Clostridioides difficile Toxin, PCR - Stool, Per Rectum (03/12/2024 15:52)    Fecal Lactoferrin Qual. - Stool, Per Rectum (03/12/2024 15:52)    Vital Signs:   /76   Pulse 61   Temp 98.1 °F (36.7 °C)   Ht 182.9 cm (72\")   Wt 78.6 kg (173 lb 3.2 oz)   SpO2 98%   BMI 23.49 kg/m²     Body mass index is 23.49 kg/m².     Physical Exam  Vitals reviewed.   Constitutional:       General: He is not in acute distress.     Appearance: He is well-developed.   HENT:      Head: Normocephalic and atraumatic.   Pulmonary:      Effort: Pulmonary effort is normal. No respiratory distress.   Abdominal:      General: Abdomen is flat. Bowel sounds are normal. There is no distension.      Palpations: Abdomen is soft.      Tenderness: There is no abdominal tenderness.   Skin:     General: Skin is dry.      Coloration: Skin is not pale.   Neurological:      Mental Status: He is alert and oriented to person, place, and time.   Psychiatric:         Thought Content: Thought content normal.           Assessment and Plan    Diagnoses and all orders for this visit:    1. Diarrhea, unspecified type (Primary)  -     Cancel: US Abdomen Limited; Future  -     diphenoxylate-atropine (LOMOTIL) 2.5-0.025 MG per tablet; Take 1 tablet by mouth 4 (Four) Times a Day As Needed for Diarrhea.  Dispense: 120 tablet; Refill: 5  -     Endoscopy, GI With Capsule    2. Weight loss  -     Endoscopy, GI With Capsule    3. Right upper quadrant abdominal pain  -     Cancel: US Abdomen Limited; Future  -     US Abdomen Limited; Future    4. Abnormal biliary HIDA scan    5. Gastroesophageal reflux disease with esophagitis without hemorrhage    Other orders  -     pantoprazole (PROTONIX) 40 MG EC tablet; Take 1 tablet by " mouth Daily.  Dispense: 60 tablet; Refill: 1         Discussion  Patient presents today for follow-up with concerns about persistent diarrhea, weight loss, and intermittent abdominal pain.  EGD findings reviewed at today's visit, no evidence of celiac disease.  He did have evidence of esophagitis and recommended treatment with PPI for 8 weeks to allow for healing but do not feel this is the source of his symptoms.    Due to ongoing issues with diarrhea and weight loss, recommended capsule endoscopy to evaluate for any small bowel Crohn's disease.    Due to persistent right upper quadrant pain, we will also schedule a right upper quadrant ultrasound to evaluate the gallbladder.    If capsule endoscopy is normal, may consider surgical consultation to discuss cholecystectomy as HIDA scan was abnormal and no other abnormality found however discussed his symptoms are somewhat atypical and could not guarantee that this would alleviate his symptoms.    If Lomotil does not help his diarrhea, could consider trial of TCA or Viberzi for IBS-D.          Follow Up   Return for Follow up to review results after testing complete.    Patient Instructions   Schedule RUQ ultrasound to evaluate the gallbladder.     Schedule capsule endoscopy for further evaluation.    For diarrhea, proceed with trial of Lomotil.    For esophagitis, take pantoprazole daily for 8 weeks to allow for healing.

## 2024-08-22 NOTE — PATIENT INSTRUCTIONS
Schedule RUQ ultrasound to evaluate the gallbladder.     Schedule capsule endoscopy for further evaluation.    For diarrhea, proceed with trial of Lomotil.    For esophagitis, take pantoprazole daily for 8 weeks to allow for healing.   no

## 2024-09-11 ENCOUNTER — OFFICE VISIT (OUTPATIENT)
Dept: GASTROENTEROLOGY | Facility: CLINIC | Age: 19
End: 2024-09-11
Payer: COMMERCIAL

## 2024-09-11 ENCOUNTER — TELEPHONE (OUTPATIENT)
Dept: GASTROENTEROLOGY | Facility: CLINIC | Age: 19
End: 2024-09-11

## 2024-09-11 DIAGNOSIS — R19.7 DIARRHEA, UNSPECIFIED TYPE: Primary | ICD-10-CM

## 2024-09-13 ENCOUNTER — TELEPHONE (OUTPATIENT)
Dept: GASTROENTEROLOGY | Facility: CLINIC | Age: 19
End: 2024-09-13
Payer: COMMERCIAL

## 2024-09-13 ENCOUNTER — PREP FOR SURGERY (OUTPATIENT)
Dept: SURGERY | Facility: SURGERY CENTER | Age: 19
End: 2024-09-13
Payer: COMMERCIAL

## 2024-09-13 DIAGNOSIS — K63.9: Primary | ICD-10-CM

## 2024-09-13 RX ORDER — SODIUM CHLORIDE 0.9 % (FLUSH) 0.9 %
3 SYRINGE (ML) INJECTION EVERY 12 HOURS SCHEDULED
OUTPATIENT
Start: 2024-09-13

## 2024-09-13 RX ORDER — SODIUM CHLORIDE 0.9 % (FLUSH) 0.9 %
10 SYRINGE (ML) INJECTION AS NEEDED
OUTPATIENT
Start: 2024-09-13

## 2024-09-13 RX ORDER — SODIUM CHLORIDE, SODIUM LACTATE, POTASSIUM CHLORIDE, CALCIUM CHLORIDE 600; 310; 30; 20 MG/100ML; MG/100ML; MG/100ML; MG/100ML
30 INJECTION, SOLUTION INTRAVENOUS CONTINUOUS PRN
OUTPATIENT
Start: 2024-09-13

## 2024-09-13 NOTE — TELEPHONE ENCOUNTER
Please let patient know Dr. Oliver has reviewed his capsule endoscopy.  There was inflammation versus lymphoid hyperplasia in the ileum.  Dr. Oliver would recommend updated colonoscopy with biopsies of the ileum.  This was not completed on his previous colonoscopy and this area was not evaluated on previous colonoscopy.  I placed order and please forward to scheduling to arrange colonoscopy after he speak with him.  Thanks.

## 2024-09-13 NOTE — PROGRESS NOTES
Capsule study shows one tiny amount of fresh blood in the ileum and some edematous/nodular mucosa in the ileum.  Recommend colonoscopy with evaluation of the terminal ileum.

## 2024-09-17 DIAGNOSIS — K21.00 GASTROESOPHAGEAL REFLUX DISEASE WITH ESOPHAGITIS WITHOUT HEMORRHAGE: Primary | ICD-10-CM

## 2024-09-17 RX ORDER — PANTOPRAZOLE SODIUM 40 MG/1
40 TABLET, DELAYED RELEASE ORAL DAILY
Qty: 90 TABLET | Refills: 3 | Status: SHIPPED | OUTPATIENT
Start: 2024-09-17

## 2024-09-18 ENCOUNTER — TELEPHONE (OUTPATIENT)
Dept: GASTROENTEROLOGY | Facility: CLINIC | Age: 19
End: 2024-09-18
Payer: COMMERCIAL

## 2024-09-18 PROBLEM — K63.9: Status: ACTIVE | Noted: 2024-09-13

## 2024-10-07 ENCOUNTER — ANESTHESIA (OUTPATIENT)
Dept: SURGERY | Facility: SURGERY CENTER | Age: 19
End: 2024-10-07
Payer: COMMERCIAL

## 2024-10-07 ENCOUNTER — ANESTHESIA EVENT (OUTPATIENT)
Dept: SURGERY | Facility: SURGERY CENTER | Age: 19
End: 2024-10-07
Payer: COMMERCIAL

## 2024-10-07 ENCOUNTER — HOSPITAL ENCOUNTER (OUTPATIENT)
Facility: SURGERY CENTER | Age: 19
Setting detail: HOSPITAL OUTPATIENT SURGERY
Discharge: HOME | End: 2024-10-07
Attending: INTERNAL MEDICINE | Admitting: INTERNAL MEDICINE
Payer: COMMERCIAL

## 2024-10-07 VITALS
RESPIRATION RATE: 16 BRPM | HEART RATE: 74 BPM | SYSTOLIC BLOOD PRESSURE: 101 MMHG | HEIGHT: 72 IN | WEIGHT: 170.2 LBS | DIASTOLIC BLOOD PRESSURE: 64 MMHG | BODY MASS INDEX: 23.05 KG/M2 | TEMPERATURE: 98.2 F | OXYGEN SATURATION: 98 %

## 2024-10-07 DIAGNOSIS — K63.9: ICD-10-CM

## 2024-10-07 PROCEDURE — 25010000002 PROPOFOL 10 MG/ML EMULSION: Performed by: NURSE ANESTHETIST, CERTIFIED REGISTERED

## 2024-10-07 PROCEDURE — 25010000002 LIDOCAINE 2% SOLUTION: Performed by: NURSE ANESTHETIST, CERTIFIED REGISTERED

## 2024-10-07 PROCEDURE — 88305 TISSUE EXAM BY PATHOLOGIST: CPT | Performed by: INTERNAL MEDICINE

## 2024-10-07 PROCEDURE — 45380 COLONOSCOPY AND BIOPSY: CPT | Performed by: INTERNAL MEDICINE

## 2024-10-07 PROCEDURE — 25010000002 PROPOFOL 200 MG/20ML EMULSION: Performed by: NURSE ANESTHETIST, CERTIFIED REGISTERED

## 2024-10-07 PROCEDURE — 25810000003 LACTATED RINGERS PER 1000 ML: Performed by: NURSE PRACTITIONER

## 2024-10-07 RX ORDER — LIDOCAINE HYDROCHLORIDE 20 MG/ML
INJECTION, SOLUTION INFILTRATION; PERINEURAL AS NEEDED
Status: DISCONTINUED | OUTPATIENT
Start: 2024-10-07 | End: 2024-10-07 | Stop reason: SURG

## 2024-10-07 RX ORDER — PROPOFOL 10 MG/ML
INJECTION, EMULSION INTRAVENOUS AS NEEDED
Status: DISCONTINUED | OUTPATIENT
Start: 2024-10-07 | End: 2024-10-07 | Stop reason: SURG

## 2024-10-07 RX ORDER — SODIUM CHLORIDE 0.9 % (FLUSH) 0.9 %
10 SYRINGE (ML) INJECTION AS NEEDED
Status: DISCONTINUED | OUTPATIENT
Start: 2024-10-07 | End: 2024-10-07 | Stop reason: HOSPADM

## 2024-10-07 RX ORDER — SODIUM CHLORIDE 0.9 % (FLUSH) 0.9 %
3 SYRINGE (ML) INJECTION EVERY 12 HOURS SCHEDULED
Status: DISCONTINUED | OUTPATIENT
Start: 2024-10-07 | End: 2024-10-07 | Stop reason: HOSPADM

## 2024-10-07 RX ORDER — SODIUM CHLORIDE, SODIUM LACTATE, POTASSIUM CHLORIDE, CALCIUM CHLORIDE 600; 310; 30; 20 MG/100ML; MG/100ML; MG/100ML; MG/100ML
30 INJECTION, SOLUTION INTRAVENOUS CONTINUOUS PRN
Status: DISCONTINUED | OUTPATIENT
Start: 2024-10-07 | End: 2024-10-07 | Stop reason: HOSPADM

## 2024-10-07 RX ADMIN — PROPOFOL 200 MCG/KG/MIN: 10 INJECTION, EMULSION INTRAVENOUS at 11:41

## 2024-10-07 RX ADMIN — LIDOCAINE HYDROCHLORIDE 100 MG: 20 INJECTION, SOLUTION INFILTRATION; PERINEURAL at 11:41

## 2024-10-07 RX ADMIN — PROPOFOL 80 MG: 10 INJECTION, EMULSION INTRAVENOUS at 11:41

## 2024-10-07 RX ADMIN — SODIUM CHLORIDE, POTASSIUM CHLORIDE, SODIUM LACTATE AND CALCIUM CHLORIDE 30 ML/HR: 600; 310; 30; 20 INJECTION, SOLUTION INTRAVENOUS at 10:35

## 2024-10-07 NOTE — H&P
No chief complaint on file.      HPI  Patient a for a colonoscopy for evaluation of right lower quadrant pain diarrhea and abnormal capsule study.         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    Weight loss    Mucosal abnormality of intestine       Medical History:    Past Medical History:   Diagnosis Date    Abdominal cramping     Abdominal pain         Social History:    Social History     Socioeconomic History    Marital status: Single   Tobacco Use    Smoking status: Never     Passive exposure: Never    Smokeless tobacco: Never   Vaping Use    Vaping status: Never Used   Substance and Sexual Activity    Alcohol use: Never    Drug use: Never    Sexual activity: Never       Family History:   Family History   Problem Relation Age of Onset    Colon cancer Neg Hx     Colon polyps Neg Hx     Crohn's disease Neg Hx     Irritable bowel syndrome Neg Hx     Ulcerative colitis Neg Hx        Surgical History:   Past Surgical History:   Procedure Laterality Date    ENDOSCOPY N/A 8/5/2024    Procedure: ESOPHAGOGASTRODUODENOSCOPY WITH BIOPSY;  Surgeon: Jase Oliver MD;  Location: [facility] OR;  Service: Gastroenterology;  Laterality: N/A;    TONSILLECTOMY      WISDOM TOOTH EXTRACTION           Current Facility-Administered Medications:     lactated ringers infusion, 30 mL/hr, Intravenous, Continuous PRN, Jimmie, Krystle G, APRN    sodium chloride 0.9 % flush 10 mL, 10 mL, Intravenous, PRN, Jimmie, Krystle G, APRN    sodium chloride 0.9 % flush 3 mL, 3 mL, Intravenous, Q12H, Jimmie, Krystle G, APRN    Allergies: No Known Allergies     The following portions of the patient's history were reviewed by me and updated as appropriate: review of systems, allergies, current medications, past family history, past medical history, past social history, past surgical history and problem list.    There were no vitals filed for this  visit.    PHYSICAL EXAM:    CONSTITUTIONAL:  today's vital signs reviewed by me  GASTROINTESTINAL: abdomen is soft nontender nondistended with normal active bowel sounds, no masses are appreciated    Assessment/ Plan  We will proceed today with colonoscopy.    Risks and benefits as well as alternatives to endoscopic evaluation were explained to the patient and they voiced understanding and wish to proceed.  These risks include but are not limited to the risk of bleeding, perforation, adverse reaction to sedation, and missed lesions.  The patient was given the opportunity to ask questions prior to the endoscopic procedure.

## 2024-10-07 NOTE — ANESTHESIA POSTPROCEDURE EVALUATION
"Patient: Margie Chau    Procedure Summary       Date: 10/07/24 Room / Location: [facility]    Anesthesia Start: 1137 Anesthesia Stop: 1159    Procedure: COLONOSCOPY to cecum with biopsy Diagnosis:       Mucosal abnormality of intestine      (Mucosal abnormality of intestine [K63.9])    Surgeons: Jase Oliver MD Provider: Bob Burris MD    Anesthesia Type: MAC ASA Status: 2            Anesthesia Type: MAC    Vitals  Vitals Value Taken Time   /64 10/07/24 1213   Temp 36.8 °C (98.2 °F) 10/07/24 1157   Pulse 74 10/07/24 1218   Resp 16 10/07/24 1218   SpO2 98 % 10/07/24 1218           Post Anesthesia Care and Evaluation    Patient location during evaluation: PACU  Level of consciousness: awake  Pain management: adequate    Airway patency: patent  Anesthetic complications: No anesthetic complications  PONV Status: controlled  Cardiovascular status: acceptable  Respiratory status: acceptable  Hydration status: acceptable    Comments: /64   Pulse 74   Temp 36.8 °C (98.2 °F) (Infrared)   Resp 16   Ht 182.9 cm (72\")   Wt 77.2 kg (170 lb 3.2 oz)   SpO2 98%   BMI 23.08 kg/m²       "

## 2024-10-07 NOTE — ANESTHESIA PREPROCEDURE EVALUATION
Anesthesia Evaluation     Patient summary reviewed and Nursing notes reviewed   NPO Solid Status: > 8 hours  NPO Liquid Status: > 2 hours           Airway   Mallampati: II  TM distance: >3 FB  Neck ROM: full  No difficulty expected  Dental - normal exam     Pulmonary - negative pulmonary ROS and normal exam   Cardiovascular - negative cardio ROS and normal exam  Exercise tolerance: good (4-7 METS)        Neuro/Psych- negative ROS  GI/Hepatic/Renal/Endo - negative ROS     Musculoskeletal (-) negative ROS    Abdominal    Substance History - negative use     OB/GYN          Other                    Anesthesia Plan    ASA 2     MAC     intravenous induction     Anesthetic plan, risks, benefits, and alternatives have been provided, discussed and informed consent has been obtained with: patient.    CODE STATUS:

## 2024-10-08 LAB
CYTO UR: NORMAL
LAB AP CASE REPORT: NORMAL
LAB AP CLINICAL INFORMATION: NORMAL
PATH REPORT.FINAL DX SPEC: NORMAL
PATH REPORT.GROSS SPEC: NORMAL

## 2024-10-11 ENCOUNTER — PATIENT MESSAGE (OUTPATIENT)
Dept: GASTROENTEROLOGY | Facility: CLINIC | Age: 19
End: 2024-10-11
Payer: COMMERCIAL

## 2024-10-11 DIAGNOSIS — R19.7 DIARRHEA, UNSPECIFIED TYPE: ICD-10-CM

## 2024-10-14 RX ORDER — DIPHENOXYLATE HCL/ATROPINE 2.5-.025MG
1 TABLET ORAL 4 TIMES DAILY PRN
Qty: 120 TABLET | Refills: 3 | Status: SHIPPED | OUTPATIENT
Start: 2024-10-14

## 2025-01-06 DIAGNOSIS — F41.1 GAD (GENERALIZED ANXIETY DISORDER): ICD-10-CM

## 2025-01-08 RX ORDER — ESCITALOPRAM OXALATE 10 MG/1
10 TABLET ORAL DAILY
Qty: 30 TABLET | Refills: 0 | Status: SHIPPED | OUTPATIENT
Start: 2025-01-08

## 2025-02-05 DIAGNOSIS — F41.1 GAD (GENERALIZED ANXIETY DISORDER): ICD-10-CM

## 2025-02-13 NOTE — TELEPHONE ENCOUNTER
Rx Refill Note  Requested Prescriptions     Pending Prescriptions Disp Refills    escitalopram (LEXAPRO) 10 MG tablet [Pharmacy Med Name: ESCITALOPRAM TABS 10MG] 90 tablet 3     Sig: TAKE 1 TABLET DAILY      Last office visit with prescribing clinician: 5/13/2024   Last telemedicine visit with prescribing clinician: Visit date not found   Next office visit with prescribing clinician: Visit date not found    Left message to schedule f/u appt.   OK FOR HUB TO RELAY    Mary Solano LPN  02/13/25, 14:14 EST

## 2025-02-14 NOTE — TELEPHONE ENCOUNTER
Please send in 30-day supply, I am not sure what pharmacy he wants me to send to as he has four listed

## 2025-02-14 NOTE — TELEPHONE ENCOUNTER
Rx Refill Note  Requested Prescriptions     Pending Prescriptions Disp Refills    escitalopram (LEXAPRO) 10 MG tablet [Pharmacy Med Name: ESCITALOPRAM TABS 10MG] 90 tablet 3     Sig: TAKE 1 TABLET DAILY      Last office visit with prescribing clinician: 5/13/2024     Next office visit with prescribing clinician: 2/17/2025     escitalopram (Lexapro) 10 MG tablet (01/08/2025)      Marcella Brewster Rep  02/14/25, 14:09 EST

## 2025-02-17 ENCOUNTER — OFFICE VISIT (OUTPATIENT)
Dept: FAMILY MEDICINE CLINIC | Age: 20
End: 2025-02-17
Payer: COMMERCIAL

## 2025-02-17 VITALS
HEART RATE: 76 BPM | WEIGHT: 189 LBS | BODY MASS INDEX: 25.63 KG/M2 | DIASTOLIC BLOOD PRESSURE: 66 MMHG | SYSTOLIC BLOOD PRESSURE: 117 MMHG | OXYGEN SATURATION: 98 % | TEMPERATURE: 97.6 F

## 2025-02-17 DIAGNOSIS — Z00.00 ANNUAL PHYSICAL EXAM: Primary | ICD-10-CM

## 2025-02-17 DIAGNOSIS — F41.1 GAD (GENERALIZED ANXIETY DISORDER): ICD-10-CM

## 2025-02-17 DIAGNOSIS — R19.7 DIARRHEA, UNSPECIFIED TYPE: ICD-10-CM

## 2025-02-17 PROCEDURE — 99214 OFFICE O/P EST MOD 30 MIN: CPT | Performed by: NURSE PRACTITIONER

## 2025-02-17 PROCEDURE — 99395 PREV VISIT EST AGE 18-39: CPT | Performed by: NURSE PRACTITIONER

## 2025-02-17 RX ORDER — LOPERAMIDE HYDROCHLORIDE 2 MG/1
2 TABLET ORAL 3 TIMES DAILY PRN
COMMUNITY

## 2025-02-17 RX ORDER — ESCITALOPRAM OXALATE 10 MG/1
10 TABLET ORAL DAILY
Qty: 30 TABLET | Refills: 0 | OUTPATIENT
Start: 2025-02-17

## 2025-02-17 RX ORDER — ESCITALOPRAM OXALATE 10 MG/1
10 TABLET ORAL DAILY
Qty: 90 TABLET | Refills: 3 | Status: SHIPPED | OUTPATIENT
Start: 2025-02-17

## 2025-02-17 NOTE — PROGRESS NOTES
Margie Chau presents to [facility] with complaint of  Anxiety, annual exam    SUBJECTIVE  History of Present Illness    Patient is present for annual physical exam and anxiety follow-up.  He is present with his mom today.  Since patient was seen in our office, he underwent multiple studies for abdominal pain and diarrhea.  He ultimately had abnormal HIDA scan and gallbladder was removed by Dr. Cortes 2 months ago.  Patient says that his diarrhea is still present but is much lower than it used to be.  He uses Bentyl and Imodium as needed still but not as much as he used to.  He is also on pantoprazole for GERD.  He does not need this medication refilled today.  For his anxiety, he is on Lexapro 10 mg daily.  He has been on this medication for almost a year.  Patient says that he can tell an improvement overall in his anxiety symptoms and he does not get his jittery or overworked.  He does not feel the medication needs to be stronger or wears off at the end of the day.    The following portions of the patient's history were reviewed and updated as appropriate: allergies, current medications, past family history, past medical history, past social history, past surgical history and problem list.    OBJECTIVE  Vital Signs:   /66 (BP Location: Right arm, Patient Position: Sitting, Cuff Size: Adult)   Pulse 76   Temp 97.6 °F (36.4 °C) (Oral)   Wt 85.7 kg (189 lb)   SpO2 98%   BMI 25.63 kg/m²       Physical Exam  Constitutional:       General: He is not in acute distress.     Appearance: Normal appearance. He is not ill-appearing.   HENT:      Head: Normocephalic and atraumatic.      Right Ear: Tympanic membrane and ear canal normal.      Left Ear: Tympanic membrane and ear canal normal.      Nose: Nose normal.      Mouth/Throat:      Pharynx: Oropharynx is clear.   Neck:      Thyroid: No thyroid mass, thyromegaly or thyroid tenderness.   Cardiovascular:      Rate and  Rhythm: Normal rate and regular rhythm.      Pulses: Normal pulses.      Heart sounds: Normal heart sounds.   Pulmonary:      Effort: Pulmonary effort is normal. No respiratory distress.      Breath sounds: Normal breath sounds.   Chest:      Chest wall: No tenderness.   Musculoskeletal:         General: Normal range of motion.      Cervical back: Normal range of motion and neck supple.   Lymphadenopathy:      Cervical: No cervical adenopathy.   Skin:     General: Skin is warm and dry.   Neurological:      General: No focal deficit present.      Mental Status: He is alert and oriented to person, place, and time. Mental status is at baseline.   Psychiatric:         Mood and Affect: Mood normal.         Behavior: Behavior normal.              ASSESSMENT AND PLAN:  Diagnoses and all orders for this visit:    1. Annual physical exam (Primary)    2. ANDREY (generalized anxiety disorder)  -     escitalopram (Lexapro) 10 MG tablet; Take 1 tablet by mouth Daily.  Dispense: 90 tablet; Refill: 3    3. Diarrhea, unspecified type      Patient's anxiety is improved, continue Lexapro 10 mg daily.  Diarrhea also improved, will hopefully see continued benefit of decreasing diarrhea since cholecystectomy.  Do not think he needs to follow-up with gastro unless new issue arises, we can refill his pantoprazole when he runs out in September.  We can also refill Bentyl when needed.    19 to 39: Counseling/Anticipatory Guidance Discussed: nutrition, physical activity, healthy weight, misuse of tobacco, alcohol and drugs, dental health, and mental health    Discussed with patient that I am transferring to [facility] primary care office in [place].  Regardless, patient is not due for appointment/annual exam until February of next year since his anxiety and abdominal symptoms are now controlled.  Patient was given new office phone number and address to follow provider there if he would like.  Otherwise, he will need to establish care next  year with provider here.    Follow Up   Return in about 1 year (around 2/17/2026) for Annual physical. Patient to notify office with any acute concerns or issues.  Patient verbalizes understanding, agrees with plan of care and has no further questions upon discharge.     Patient was given instructions and counseling regarding his condition or for health maintenance advice. Please see specific information pulled into the AVS if appropriate.     Discussed the importance of following up with any needed screening tests/labs/specialist appointments and any requested follow-up recommended by me today. Importance of maintaining follow-up discussed and patient accepts that missed appointments can delay diagnosis and potentially lead to worsening of conditions.    Part of this note may be an electronic transcription/translation of spoken language to printed text using the Dragon Dictation System.

## 2025-04-15 ENCOUNTER — TELEPHONE (OUTPATIENT)
Dept: FAMILY MEDICINE CLINIC | Age: 20
End: 2025-04-15
Payer: COMMERCIAL

## (undated) DEVICE — KT ORCA ORCAPOD DISP STRL

## (undated) DEVICE — BITEBLOCK OMNI BLOC

## (undated) DEVICE — SINGLE-USE BIOPSY FORCEPS: Brand: RADIAL JAW 4

## (undated) DEVICE — VIAL FORMLN CAP 10PCT 20ML

## (undated) DEVICE — Device

## (undated) DEVICE — GOWN ,SIRUS,NONREINFORCED 3XL: Brand: MEDLINE

## (undated) DEVICE — ADAPT CLN SCPE ENDO PORPOISE BX/50 DISP

## (undated) DEVICE — MSK ENDO PORT O2 POM ELITE CURAPLEX A/

## (undated) DEVICE — CANN O2 ETCO2 FITS ALL CONN CO2 SMPL A/ 7IN DISP LF

## (undated) DEVICE — FLEX ADVANTAGE 1500CC: Brand: FLEX ADVANTAGE

## (undated) DEVICE — GOWN ISOL W/THUMB UNIV BLU BX/15